# Patient Record
Sex: FEMALE | Race: OTHER | HISPANIC OR LATINO | Employment: UNEMPLOYED | URBAN - METROPOLITAN AREA
[De-identification: names, ages, dates, MRNs, and addresses within clinical notes are randomized per-mention and may not be internally consistent; named-entity substitution may affect disease eponyms.]

---

## 2023-07-25 DIAGNOSIS — O35.9XX0 SUSPECTED FETAL ANOMALY, ANTEPARTUM, SINGLE OR UNSPECIFIED FETUS: Primary | ICD-10-CM

## 2023-08-03 ENCOUNTER — OFFICE VISIT (OUTPATIENT)
Dept: MATERNAL FETAL MEDICINE | Facility: CLINIC | Age: 28
End: 2023-08-03
Payer: MEDICAID

## 2023-08-03 VITALS
OXYGEN SATURATION: 98 % | SYSTOLIC BLOOD PRESSURE: 124 MMHG | RESPIRATION RATE: 20 BRPM | HEART RATE: 116 BPM | WEIGHT: 158 LBS | DIASTOLIC BLOOD PRESSURE: 62 MMHG

## 2023-08-03 DIAGNOSIS — O35.03X0 CHOROID PLEXUS CYST OF FETUS AFFECTING CARE OF MOTHER, ANTEPARTUM, SINGLE OR UNSPECIFIED FETUS: ICD-10-CM

## 2023-08-03 DIAGNOSIS — O35.9XX0 SUSPECTED FETAL ANOMALY, ANTEPARTUM, SINGLE OR UNSPECIFIED FETUS: ICD-10-CM

## 2023-08-03 PROCEDURE — 99203 OFFICE O/P NEW LOW 30 MIN: CPT | Mod: TH,S$GLB,, | Performed by: OBSTETRICS & GYNECOLOGY

## 2023-08-03 PROCEDURE — 3078F PR MOST RECENT DIASTOLIC BLOOD PRESSURE < 80 MM HG: ICD-10-PCS | Mod: CPTII,S$GLB,, | Performed by: OBSTETRICS & GYNECOLOGY

## 2023-08-03 PROCEDURE — 1160F PR REVIEW ALL MEDS BY PRESCRIBER/CLIN PHARMACIST DOCUMENTED: ICD-10-PCS | Mod: CPTII,S$GLB,, | Performed by: OBSTETRICS & GYNECOLOGY

## 2023-08-03 PROCEDURE — 3078F DIAST BP <80 MM HG: CPT | Mod: CPTII,S$GLB,, | Performed by: OBSTETRICS & GYNECOLOGY

## 2023-08-03 PROCEDURE — 76811 PR US, OB FETAL EVAL & EXAM, TRANSABDOM,FIRST GESTATION: ICD-10-PCS | Mod: S$GLB,,, | Performed by: OBSTETRICS & GYNECOLOGY

## 2023-08-03 PROCEDURE — 76811 OB US DETAILED SNGL FETUS: CPT | Mod: S$GLB,,, | Performed by: OBSTETRICS & GYNECOLOGY

## 2023-08-03 PROCEDURE — 3074F SYST BP LT 130 MM HG: CPT | Mod: CPTII,S$GLB,, | Performed by: OBSTETRICS & GYNECOLOGY

## 2023-08-03 PROCEDURE — 1159F MED LIST DOCD IN RCRD: CPT | Mod: CPTII,S$GLB,, | Performed by: OBSTETRICS & GYNECOLOGY

## 2023-08-03 PROCEDURE — 3074F PR MOST RECENT SYSTOLIC BLOOD PRESSURE < 130 MM HG: ICD-10-PCS | Mod: CPTII,S$GLB,, | Performed by: OBSTETRICS & GYNECOLOGY

## 2023-08-03 PROCEDURE — 1160F RVW MEDS BY RX/DR IN RCRD: CPT | Mod: CPTII,S$GLB,, | Performed by: OBSTETRICS & GYNECOLOGY

## 2023-08-03 PROCEDURE — 1159F PR MEDICATION LIST DOCUMENTED IN MEDICAL RECORD: ICD-10-PCS | Mod: CPTII,S$GLB,, | Performed by: OBSTETRICS & GYNECOLOGY

## 2023-08-03 PROCEDURE — 99203 PR OFFICE/OUTPT VISIT, NEW, LEVL III, 30-44 MIN: ICD-10-PCS | Mod: TH,S$GLB,, | Performed by: OBSTETRICS & GYNECOLOGY

## 2023-08-03 RX ORDER — FOLIC ACID 1 MG/1
TABLET ORAL
COMMUNITY
Start: 2023-04-27

## 2023-08-03 RX ORDER — FERROUS SULFATE TAB 325 MG (65 MG ELEMENTAL FE) 325 (65 FE) MG
TAB ORAL
COMMUNITY
Start: 2023-07-24

## 2023-08-03 RX ORDER — PNV NO.95/FERROUS FUM/FOLIC AC 28MG-0.8MG
1 TABLET ORAL
COMMUNITY
Start: 2023-04-27

## 2023-08-03 NOTE — PROGRESS NOTES
Mitra is here for initial MFM consultation, referred by Dr. Koch for choroid plexus cyst.    She is feeling fetal movement.    Mitra denies vaginal bleeding, loss of fluid, recurrent contractions, but she does complain of feet and legs cramping at night.    Today's intake was completed using translation service.      Vitals:    08/03/23 1333   BP: 124/62   Pulse: (!) 116   Resp: 20   SpO2: 98%   Weight: 71.7 kg (158 lb)      BMI:                    no BMI for this encounter

## 2023-08-03 NOTE — PROGRESS NOTES
Initial Maternal-Fetal Medicine evaluation    Indications:   1. Pregnancy at 27 weeks 2 days   2.  Fetal choroid plexus cyst  3. Low risk NIPT results.  Normal maternal serum alpha fetoprotein level      Dear Dr. Koch,     Thank you very much for asking us see your patient.  She is a 27-year-old  2 para 1001.  She had an ultrasound performed earlier which showed a fetal choroid plexus cyst.  She elected to have cell free fetal DNA testing (NIPT) and returned with a low risk for chromosomal defects.  She also had maternal serum alpha fetoprotein level returned with normal levels.  She denies having any other problems or complications during the current pregnancy.      She is had 1 prior pregnancy.  It was a term delivery of a 7 lb 5 oz girl at 38 and half weeks.  No complications occured during that pregnancy.      Her past medical history, surgery history, family history, and social history are all negative.  She has no increased risk for chromosomal defects because of a family history.    Physical findings:  General: Healthy-appearing female in no distress   Vital signs:  Blood pressure 124/62, pulse 116, respiratory rate is 20.    HEENT: Her face is not edematous and eyes are nonicteric.  Nose and throat are clear.    Abdomen:  Soft nondistended without hepatosplenomegaly or uterine tenderness.  The uterus is appropriate size for the gestational age.    Extremities:  Without clubbing cyanosis edema.    Reflexes:  1+ equal bilaterally.      Ultrasound findings:  A separate ultrasound report will be provided to you which will have a detailed description of our findings today.  A brief summary that the fetus is in breech presentation with adequate fluid.  The fetus was actively moving had a normal heart rate.  Biometric measurements confirmed the gestational age, implying normal growth.  No placental abnormalities were noted.  A complete anatomical survey was completed today.  Everything appeared completely  normal.  Both choroid plexes showed no evidence of cysts.  The rest of the intracranial anatomy appeared normal.  A careful search for other anomalies that could indicate a chromosomal defect were negative also.  No other markers for chromosomal defects were seen either.    Counseling:  The patient was informed the fact that a choroid plexus cyst is a risk factor for trisomy 18.  However it is considered a poor predictor of trisomy 18, increasing the risk for this chromosomal defect by only 1%.  However, her risk has been significantly reduced from this 1% risk because of the low risk cell free DNA test results, and the fact that no other findings for trisomy 18 were seen on the ultrasound today, and since the choroid plexus cyst has resolved.  So the risk for trisomy 18 now is considerably less than 1%.    She has no other real risk factors and so I feel this patient can now be followed with routine prenatal care and no further Maternal-Fetal Medicine evaluations are currently indicated.      Thanks again for the referral.  Please call us if you have any questions.

## 2023-08-03 NOTE — LETTER
August 3, 2023        Cheryl Koch MD  [address]  [place] [address]             [facility]  [address]  [place] [address]  Phone: [phone]  Fax: [phone]   Patient: Mitra Gaytan   MR Number: [id number]   YOB: 1995   Date of Visit: 8/3/2023       Dear Dr. Koch:    Thank you for referring Mitra Gaytan to me for evaluation. Attached you will find relevant portions of my assessment and plan of care.    If you have questions, please do not hesitate to call me. I look forward to following Mitra Gaytan along with you.    Sincerely,      Ricardo Zavala MD            CC  No Recipients    Enclosure